# Patient Record
Sex: FEMALE | Race: WHITE | NOT HISPANIC OR LATINO | URBAN - METROPOLITAN AREA
[De-identification: names, ages, dates, MRNs, and addresses within clinical notes are randomized per-mention and may not be internally consistent; named-entity substitution may affect disease eponyms.]

---

## 2023-05-28 ENCOUNTER — EMERGENCY (EMERGENCY)
Facility: HOSPITAL | Age: 43
LOS: 0 days | Discharge: HOME | End: 2023-05-28
Attending: STUDENT IN AN ORGANIZED HEALTH CARE EDUCATION/TRAINING PROGRAM
Payer: COMMERCIAL

## 2023-05-28 VITALS — HEIGHT: 64 IN | WEIGHT: 143.08 LBS

## 2023-05-28 VITALS
HEART RATE: 80 BPM | OXYGEN SATURATION: 100 % | DIASTOLIC BLOOD PRESSURE: 75 MMHG | TEMPERATURE: 99 F | SYSTOLIC BLOOD PRESSURE: 123 MMHG | RESPIRATION RATE: 16 BRPM

## 2023-05-28 DIAGNOSIS — R06.02 SHORTNESS OF BREATH: ICD-10-CM

## 2023-05-28 DIAGNOSIS — R52 PAIN, UNSPECIFIED: ICD-10-CM

## 2023-05-28 DIAGNOSIS — Z90.89 ACQUIRED ABSENCE OF OTHER ORGANS: ICD-10-CM

## 2023-05-28 DIAGNOSIS — R00.2 PALPITATIONS: ICD-10-CM

## 2023-05-28 DIAGNOSIS — R53.83 OTHER FATIGUE: ICD-10-CM

## 2023-05-28 DIAGNOSIS — Z85.850 PERSONAL HISTORY OF MALIGNANT NEOPLASM OF THYROID: ICD-10-CM

## 2023-05-28 LAB
ALBUMIN SERPL ELPH-MCNC: 4.1 G/DL — SIGNIFICANT CHANGE UP (ref 3.3–5)
ALP SERPL-CCNC: 63 U/L — SIGNIFICANT CHANGE UP (ref 40–120)
ALT FLD-CCNC: 25 U/L — SIGNIFICANT CHANGE UP (ref 12–78)
ANION GAP SERPL CALC-SCNC: 5 MMOL/L — SIGNIFICANT CHANGE UP (ref 5–17)
AST SERPL-CCNC: 22 U/L — SIGNIFICANT CHANGE UP (ref 15–37)
BASOPHILS # BLD AUTO: 0.04 K/UL — SIGNIFICANT CHANGE UP (ref 0–0.2)
BASOPHILS NFR BLD AUTO: 0.7 % — SIGNIFICANT CHANGE UP (ref 0–2)
BILIRUB SERPL-MCNC: 0.4 MG/DL — SIGNIFICANT CHANGE UP (ref 0.2–1.2)
BUN SERPL-MCNC: 8 MG/DL — SIGNIFICANT CHANGE UP (ref 7–23)
CALCIUM SERPL-MCNC: 9.2 MG/DL — SIGNIFICANT CHANGE UP (ref 8.5–10.1)
CHLORIDE SERPL-SCNC: 107 MMOL/L — SIGNIFICANT CHANGE UP (ref 96–108)
CO2 SERPL-SCNC: 29 MMOL/L — SIGNIFICANT CHANGE UP (ref 22–31)
CREAT SERPL-MCNC: 0.7 MG/DL — SIGNIFICANT CHANGE UP (ref 0.5–1.3)
D DIMER BLD IA.RAPID-MCNC: 204 NG/ML DDU — SIGNIFICANT CHANGE UP
EGFR: 110 ML/MIN/1.73M2 — SIGNIFICANT CHANGE UP
EOSINOPHIL # BLD AUTO: 0.06 K/UL — SIGNIFICANT CHANGE UP (ref 0–0.5)
EOSINOPHIL NFR BLD AUTO: 1.1 % — SIGNIFICANT CHANGE UP (ref 0–6)
GLUCOSE SERPL-MCNC: 88 MG/DL — SIGNIFICANT CHANGE UP (ref 70–99)
HCT VFR BLD CALC: 38.8 % — SIGNIFICANT CHANGE UP (ref 34.5–45)
HGB BLD-MCNC: 12 G/DL — SIGNIFICANT CHANGE UP (ref 11.5–15.5)
IMM GRANULOCYTES NFR BLD AUTO: 0.2 % — SIGNIFICANT CHANGE UP (ref 0–0.9)
LYMPHOCYTES # BLD AUTO: 1.69 K/UL — SIGNIFICANT CHANGE UP (ref 1–3.3)
LYMPHOCYTES # BLD AUTO: 29.9 % — SIGNIFICANT CHANGE UP (ref 13–44)
MAGNESIUM SERPL-MCNC: 2 MG/DL — SIGNIFICANT CHANGE UP (ref 1.6–2.6)
MCHC RBC-ENTMCNC: 25 PG — LOW (ref 27–34)
MCHC RBC-ENTMCNC: 30.9 GM/DL — LOW (ref 32–36)
MCV RBC AUTO: 80.8 FL — SIGNIFICANT CHANGE UP (ref 80–100)
MONOCYTES # BLD AUTO: 0.27 K/UL — SIGNIFICANT CHANGE UP (ref 0–0.9)
MONOCYTES NFR BLD AUTO: 4.8 % — SIGNIFICANT CHANGE UP (ref 2–14)
NEUTROPHILS # BLD AUTO: 3.59 K/UL — SIGNIFICANT CHANGE UP (ref 1.8–7.4)
NEUTROPHILS NFR BLD AUTO: 63.3 % — SIGNIFICANT CHANGE UP (ref 43–77)
NT-PROBNP SERPL-SCNC: 41 PG/ML — SIGNIFICANT CHANGE UP (ref 0–125)
PLATELET # BLD AUTO: 269 K/UL — SIGNIFICANT CHANGE UP (ref 150–400)
POTASSIUM SERPL-MCNC: 3.9 MMOL/L — SIGNIFICANT CHANGE UP (ref 3.5–5.3)
POTASSIUM SERPL-SCNC: 3.9 MMOL/L — SIGNIFICANT CHANGE UP (ref 3.5–5.3)
PROT SERPL-MCNC: 8 GM/DL — SIGNIFICANT CHANGE UP (ref 6–8.3)
RBC # BLD: 4.8 M/UL — SIGNIFICANT CHANGE UP (ref 3.8–5.2)
RBC # FLD: 15.7 % — HIGH (ref 10.3–14.5)
SODIUM SERPL-SCNC: 141 MMOL/L — SIGNIFICANT CHANGE UP (ref 135–145)
TROPONIN I, HIGH SENSITIVITY RESULT: <3 NG/L — SIGNIFICANT CHANGE UP
TSH SERPL-MCNC: 2.9 UU/ML — SIGNIFICANT CHANGE UP (ref 0.34–4.82)
WBC # BLD: 5.66 K/UL — SIGNIFICANT CHANGE UP (ref 3.8–10.5)
WBC # FLD AUTO: 5.66 K/UL — SIGNIFICANT CHANGE UP (ref 3.8–10.5)

## 2023-05-28 PROCEDURE — 85025 COMPLETE CBC W/AUTO DIFF WBC: CPT

## 2023-05-28 PROCEDURE — 80053 COMPREHEN METABOLIC PANEL: CPT

## 2023-05-28 PROCEDURE — 93010 ELECTROCARDIOGRAM REPORT: CPT

## 2023-05-28 PROCEDURE — 93005 ELECTROCARDIOGRAM TRACING: CPT

## 2023-05-28 PROCEDURE — 36415 COLL VENOUS BLD VENIPUNCTURE: CPT

## 2023-05-28 PROCEDURE — 83880 ASSAY OF NATRIURETIC PEPTIDE: CPT

## 2023-05-28 PROCEDURE — 71045 X-RAY EXAM CHEST 1 VIEW: CPT

## 2023-05-28 PROCEDURE — 99285 EMERGENCY DEPT VISIT HI MDM: CPT | Mod: 25

## 2023-05-28 PROCEDURE — 84443 ASSAY THYROID STIM HORMONE: CPT

## 2023-05-28 PROCEDURE — 83735 ASSAY OF MAGNESIUM: CPT

## 2023-05-28 PROCEDURE — 99285 EMERGENCY DEPT VISIT HI MDM: CPT

## 2023-05-28 PROCEDURE — 85379 FIBRIN DEGRADATION QUANT: CPT

## 2023-05-28 PROCEDURE — 71045 X-RAY EXAM CHEST 1 VIEW: CPT | Mod: 26

## 2023-05-28 PROCEDURE — 84484 ASSAY OF TROPONIN QUANT: CPT

## 2023-05-28 RX ORDER — SODIUM CHLORIDE 9 MG/ML
1000 INJECTION INTRAMUSCULAR; INTRAVENOUS; SUBCUTANEOUS ONCE
Refills: 0 | Status: COMPLETED | OUTPATIENT
Start: 2023-05-28 | End: 2023-05-28

## 2023-05-28 RX ADMIN — SODIUM CHLORIDE 1000 MILLILITER(S): 9 INJECTION INTRAMUSCULAR; INTRAVENOUS; SUBCUTANEOUS at 13:20

## 2023-05-28 NOTE — ED STATDOCS - PROGRESS NOTE DETAILS
PA: Patient is a 43 y/o F with PMHx of thyroid CA s/p thyroid lobectomy who presents to [facility] c/o SOB, palpitations, fatigue general malaise for several days. Had blood work done which was normal, intermittent in nature. no coughing of blood, hemoptysis, fevers, CP, sx worse with exertion. ~Bruce Wilson PA-C PA note: All labwork/radiology results discussed in detail with patient. Patient re-examined and re-evaluated. Patient feels much better at this time. ED evaluation, Diagnosis and management discussed with the patient in detail. Workup results discussed with ED attending, OK to dc home. Close PMD & CARDIO follow up encouraged, aftercare to assist with scheduling appointment ASAP. Strict ED return instructions discussed in detail and patient given the opportunity to ask any questions about their discharge diagnosis and instructions. Patient verbalized understanding. ~ Bruce Wilson PA-C

## 2023-05-28 NOTE — ED STATDOCS - NS ED ATTENDING STATEMENT MOD
This was a shared visit with the MEDHAT. I reviewed and verified the documentation and independently performed the documented:

## 2023-05-28 NOTE — ED STATDOCS - PHYSICAL EXAMINATION
PA NOTE: GEN: AOX3, NAD. HEENT: Throat clear. Airway is patent. EYES: PERRLA. EOMI. Head: NC/AT. NECK: Supple, No JVD. FROM. C-spine non-tender. CV:S1S2, RRR, LUNGS: Non-labored breathing, no tachypnea. O2sat 100% RA. CTA b/l. No w/r/r. CHEST: Equal chest expansion and rise. No deformity. ABD: Soft, NT/ND, no rebound, no guarding. No CVAT. EXT: No e/c/c. 2+ distal pulses. SKIN: No rashes. NEURO: No focal deficits. CN II-XII intact. FROM. 5/5 motor and sensory. ~Bruce Wilson PA-C

## 2023-05-28 NOTE — ED STATDOCS - OBJECTIVE STATEMENT
41 y/o F with a PMHx of thyroid CA s/p thyroid lobectomy presents to the ED c/o SOB, palpitations, fatigue general malaise for several days. Had blood work done which was normal, intermittent in nature. no coughing of blood, hemoptysis, fevers, CP, sx worse with exertion. No recent travel, no hormone therapy. also having burning like pain b/l.

## 2023-05-28 NOTE — ED STATDOCS - ATTENDING APP SHARED VISIT CONTRIBUTION OF CARE
I, Lyle Maravilla, DO personally saw the patient with MEDHAT.  I have personally performed a face to face diagnostic evaluation on this patient.  I have reviewed the MEDHAT note and agree with the history, exam, and plan of care, except as noted.  I personally saw the patient and performed a substantive portion of the visit including all aspects of the medical decision making.

## 2023-05-28 NOTE — ED STATDOCS - PATIENT PORTAL LINK FT
You can access the FollowMyHealth Patient Portal offered by [facility] by registering at the following website: [email or web address]. By joining [employer]’s FollowMyHealth portal, you will also be able to view your health information using other applications (apps) compatible with our system.

## 2023-05-28 NOTE — ED STATDOCS - NSFOLLOWUPINSTRUCTIONS_ED_ALL_ED_FT
Palpitations  Palpitations are feelings that your heartbeat is irregular or is faster than normal. It may feel like your heart is fluttering or skipping a beat. Palpitations may be caused by many things, including smoking, caffeine, alcohol, stress, and certain medicines or drugs. Most causes of palpitations are not serious.    However, some palpitations can be a sign of a serious problem. Further tests and a thorough medical history will be done to find the cause of your palpitations. Your provider may order tests such as an ECG, labs, an echocardiogram, or an ambulatory continuous ECG monitor.    Follow these instructions at home:  Pay attention to any changes in your symptoms. Let your health care provider know about them. Take these actions to help manage your symptoms:    Eating and drinking    Follow instructions from your health care provider about eating or drinking restrictions. You may need to avoid foods and drinks that may cause palpitations. These may include:  Caffeinated coffee, tea, soft drinks, and energy drinks.  Chocolate.  Alcohol.  Diet pills.  Lifestyle    A person sitting on the floor doing yoga.  A sign telling the reader not to smoke.  Take steps to reduce your stress and anxiety. Things that can help you relax include:  Yoga.  Mind-body activities, such as deep breathing, meditation, or using words and images to create positive thoughts (guided imagery).  Physical activity, such as swimming, jogging, or walking. Tell your health care provider if your palpitations increase with activity. If you have chest pain or shortness of breath with activity, do not continue the activity until you are seen by your health care provider.  Biofeedback. This is a method that helps you learn to use your mind to control things in your body, such as your heartbeat.  Get plenty of rest and sleep. Keep a regular bed time.  Do not use drugs, including cocaine or ecstasy. Do not use marijuana.  Do not use any products that contain nicotine or tobacco. These products include cigarettes, chewing tobacco, and vaping devices, such as e-cigarettes. If you need help quitting, ask your health care provider.  General instructions    Take over-the-counter and prescription medicines only as told by your health care provider.  Keep all follow-up visits. This is important. These may include visits for further testing if palpitations do not go away or get worse.  Contact a health care provider if:  You continue to have a fast or irregular heartbeat for a long period of time.  You notice that your palpitations occur more often.  Get help right away if:  You have chest pain or shortness of breath.  You have a severe headache.  You feel dizzy or you faint.  These symptoms may represent a serious problem that is an emergency. Do not wait to see if the symptoms will go away. Get medical help right away. Call your local emergency services (911 in the [place] Do not drive yourself to the hospital.    Summary  Palpitations are feelings that your heartbeat is irregular or is faster than normal. It may feel like your heart is fluttering or skipping a beat.  Palpitations may be caused by many things, including smoking, caffeine, alcohol, stress, certain medicines, and drugs.  Further tests and a thorough medical history may be done to find the cause of your palpitations.  Get help right away if you faint or have chest pain, shortness of breath, severe headache, or dizziness.  This information is not intended to replace advice given to you by your health care provider. Make sure you discuss any questions you have with your health care provider.

## 2023-05-28 NOTE — ED ADULT NURSE NOTE - NSFALLUNIVINTERV_ED_ALL_ED
Bed/Stretcher in lowest position, wheels locked, appropriate side rails in place/Call bell, personal items and telephone in reach/Instruct patient to call for assistance before getting out of bed/chair/stretcher/Non-slip footwear applied when patient is off stretcher/[place] to call system/Physically safe environment - no spills, clutter or unnecessary equipment/Purposeful proactive rounding/Room/bathroom lighting operational, light cord in reach

## 2023-05-28 NOTE — ED STATDOCS - PROVIDER TOKENS
Thank you for choosing [facility] Podiatry / Foot & Ankle Surgery!    DR. HENDRIX'S CLINIC LOCATIONS     [facility] SURGERY: [phone]   [address] [place] [address] APPOINTMENTS: [phone]   [place] [address] BILLING QUESTIONS: [phone]  [phone] RADIOLOGY: [phone]       [address]  [address]    [place], [place] [address] [phone]  [phone]      Follow up: 2 months    Gradually return to weightbearing activities.  Orthotics should be renewed annually.  Physical therapy is an option if you have difficulty returning to regular activity.        Flu vaccines are now available at all [facility] clinics and retail pharmacies across the [facility]. Appointments are required for clinic locations. To schedule an appointment online, please log into [employer] or create an account if you are a new user. You can also call [phone], or simply walk in at one of the [facility] retail pharmacy locations.      PROVIDER:[TOKEN:[[id number]],FOLLOWUP:[Urgent]]

## 2023-05-28 NOTE — ED STATDOCS - CLINICAL SUMMARY MEDICAL DECISION MAKING FREE TEXT BOX
41 y/o F with SOB, palpitations. Plan: thyroid check, ddimer, trop, CXR. If all negative will d/c with PCP f/u. hx of palpitations in the past with halter negative which was negative. 43 y/o F with SOB, palpitations. VSS, exam unremarkable. Plan: thyroid check, ddimer, trop, CXR. If all negative will d/c with PCP f/u. hx of palpitations in the past with halter negative which was negative. 43 y/o F with SOB, palpitations. VSS, exam unremarkable. Plan: thyroid check, ddimer, trop, CXR. If all negative will d/c with PCP f/u. hx of palpitations in the past with halter negative which was negative.      PA note: All labwork/radiology results discussed in detail with patient. Patient re-examined and re-evaluated. Patient feels much better at this time. ED evaluation, Diagnosis and management discussed with the patient in detail. Workup results discussed with ED attending, OK to CA home. Close PMD & CARDIO follow up encouraged, aftercare to assist with scheduling appointment ASAP. Strict ED return instructions discussed in detail and patient given the opportunity to ask any questions about their discharge diagnosis and instructions. Patient verbalized understanding. ~ Bruce Wilson PA-C

## 2023-05-28 NOTE — ED ADULT TRIAGE NOTE - CHIEF COMPLAINT QUOTE
Pt presents to ER c/o SOB, CP and palpitations. Onset of symptoms began 5 days PTA and have been intermittent. Pt reports HENRY

## 2023-05-28 NOTE — ED STATDOCS - CARE PROVIDER_API CALL
No significant past surgical history
Guicho Walsh  Cardiovascular Disease  [address], [address]  [place] [address]  Phone: [phone]  Fax: [phone]  Follow Up Time: Urgent

## 2023-05-28 NOTE — ED ADULT NURSE NOTE - OBJECTIVE STATEMENT
Pt to Ed c/o palpitations x3 days, ptv reports back pain as well, pt reports HENRY x 3days, reports nausea and diarrhea x  days, PIV obtained, labs sent, EKG done or cardiac monitor.

## 2023-05-31 ENCOUNTER — INPATIENT (INPATIENT)
Facility: HOSPITAL | Age: 43
LOS: 0 days | Discharge: HOME | DRG: 103 | End: 2023-06-01
Attending: STUDENT IN AN ORGANIZED HEALTH CARE EDUCATION/TRAINING PROGRAM | Admitting: INTERNAL MEDICINE
Payer: COMMERCIAL

## 2023-05-31 VITALS
DIASTOLIC BLOOD PRESSURE: 88 MMHG | TEMPERATURE: 98 F | OXYGEN SATURATION: 100 % | RESPIRATION RATE: 17 BRPM | HEIGHT: 64 IN | SYSTOLIC BLOOD PRESSURE: 127 MMHG | HEART RATE: 89 BPM | WEIGHT: 149.91 LBS

## 2023-05-31 LAB
ALBUMIN SERPL ELPH-MCNC: 3.8 G/DL — SIGNIFICANT CHANGE UP (ref 3.3–5)
ALP SERPL-CCNC: 57 U/L — SIGNIFICANT CHANGE UP (ref 40–120)
ALT FLD-CCNC: 22 U/L — SIGNIFICANT CHANGE UP (ref 12–78)
ANION GAP SERPL CALC-SCNC: 5 MMOL/L — SIGNIFICANT CHANGE UP (ref 5–17)
APTT BLD: 27.3 SEC — LOW (ref 27.5–35.5)
AST SERPL-CCNC: 19 U/L — SIGNIFICANT CHANGE UP (ref 15–37)
BASOPHILS # BLD AUTO: 0.02 K/UL — SIGNIFICANT CHANGE UP (ref 0–0.2)
BASOPHILS NFR BLD AUTO: 0.4 % — SIGNIFICANT CHANGE UP (ref 0–2)
BILIRUB SERPL-MCNC: 0.5 MG/DL — SIGNIFICANT CHANGE UP (ref 0.2–1.2)
BUN SERPL-MCNC: 9 MG/DL — SIGNIFICANT CHANGE UP (ref 7–23)
CALCIUM SERPL-MCNC: 9 MG/DL — SIGNIFICANT CHANGE UP (ref 8.5–10.1)
CHLORIDE SERPL-SCNC: 108 MMOL/L — SIGNIFICANT CHANGE UP (ref 96–108)
CK SERPL-CCNC: 68 U/L — SIGNIFICANT CHANGE UP (ref 26–192)
CO2 SERPL-SCNC: 28 MMOL/L — SIGNIFICANT CHANGE UP (ref 22–31)
CREAT SERPL-MCNC: 0.82 MG/DL — SIGNIFICANT CHANGE UP (ref 0.5–1.3)
EGFR: 91 ML/MIN/1.73M2 — SIGNIFICANT CHANGE UP
EOSINOPHIL # BLD AUTO: 0.08 K/UL — SIGNIFICANT CHANGE UP (ref 0–0.5)
EOSINOPHIL NFR BLD AUTO: 1.6 % — SIGNIFICANT CHANGE UP (ref 0–6)
GLUCOSE SERPL-MCNC: 106 MG/DL — HIGH (ref 70–99)
HCG SERPL-ACNC: <1 MIU/ML — SIGNIFICANT CHANGE UP
HCT VFR BLD CALC: 38.3 % — SIGNIFICANT CHANGE UP (ref 34.5–45)
HGB BLD-MCNC: 12 G/DL — SIGNIFICANT CHANGE UP (ref 11.5–15.5)
IMM GRANULOCYTES NFR BLD AUTO: 0.2 % — SIGNIFICANT CHANGE UP (ref 0–0.9)
INR BLD: 1.06 RATIO — SIGNIFICANT CHANGE UP (ref 0.88–1.16)
LYMPHOCYTES # BLD AUTO: 1.79 K/UL — SIGNIFICANT CHANGE UP (ref 1–3.3)
LYMPHOCYTES # BLD AUTO: 35.2 % — SIGNIFICANT CHANGE UP (ref 13–44)
MCHC RBC-ENTMCNC: 25.1 PG — LOW (ref 27–34)
MCHC RBC-ENTMCNC: 31.3 GM/DL — LOW (ref 32–36)
MCV RBC AUTO: 80 FL — SIGNIFICANT CHANGE UP (ref 80–100)
MONOCYTES # BLD AUTO: 0.38 K/UL — SIGNIFICANT CHANGE UP (ref 0–0.9)
MONOCYTES NFR BLD AUTO: 7.5 % — SIGNIFICANT CHANGE UP (ref 2–14)
NEUTROPHILS # BLD AUTO: 2.81 K/UL — SIGNIFICANT CHANGE UP (ref 1.8–7.4)
NEUTROPHILS NFR BLD AUTO: 55.1 % — SIGNIFICANT CHANGE UP (ref 43–77)
PLATELET # BLD AUTO: 242 K/UL — SIGNIFICANT CHANGE UP (ref 150–400)
POTASSIUM SERPL-MCNC: 3.2 MMOL/L — LOW (ref 3.5–5.3)
POTASSIUM SERPL-SCNC: 3.2 MMOL/L — LOW (ref 3.5–5.3)
PROT SERPL-MCNC: 7.5 GM/DL — SIGNIFICANT CHANGE UP (ref 6–8.3)
PROTHROM AB SERPL-ACNC: 12.3 SEC — SIGNIFICANT CHANGE UP (ref 10.5–13.4)
RBC # BLD: 4.79 M/UL — SIGNIFICANT CHANGE UP (ref 3.8–5.2)
RBC # FLD: 15.7 % — HIGH (ref 10.3–14.5)
SODIUM SERPL-SCNC: 141 MMOL/L — SIGNIFICANT CHANGE UP (ref 135–145)
TROPONIN I, HIGH SENSITIVITY RESULT: <3 NG/L — SIGNIFICANT CHANGE UP
TROPONIN I, HIGH SENSITIVITY RESULT: <3 NG/L — SIGNIFICANT CHANGE UP
TSH SERPL-MCNC: 3.31 UU/ML — SIGNIFICANT CHANGE UP (ref 0.34–4.82)
WBC # BLD: 5.09 K/UL — SIGNIFICANT CHANGE UP (ref 3.8–10.5)
WBC # FLD AUTO: 5.09 K/UL — SIGNIFICANT CHANGE UP (ref 3.8–10.5)

## 2023-05-31 PROCEDURE — 71045 X-RAY EXAM CHEST 1 VIEW: CPT | Mod: 26

## 2023-05-31 PROCEDURE — 70496 CT ANGIOGRAPHY HEAD: CPT | Mod: 26,MA

## 2023-05-31 PROCEDURE — 99285 EMERGENCY DEPT VISIT HI MDM: CPT

## 2023-05-31 PROCEDURE — 0042T: CPT

## 2023-05-31 PROCEDURE — 93010 ELECTROCARDIOGRAM REPORT: CPT

## 2023-05-31 PROCEDURE — 70498 CT ANGIOGRAPHY NECK: CPT | Mod: 26,MA

## 2023-05-31 RX ORDER — POTASSIUM CHLORIDE 20 MEQ
40 PACKET (EA) ORAL ONCE
Refills: 0 | Status: COMPLETED | OUTPATIENT
Start: 2023-05-31 | End: 2023-05-31

## 2023-05-31 RX ORDER — ASPIRIN/CALCIUM CARB/MAGNESIUM 324 MG
324 TABLET ORAL ONCE
Refills: 0 | Status: COMPLETED | OUTPATIENT
Start: 2023-05-31 | End: 2023-05-31

## 2023-05-31 RX ADMIN — Medication 324 MILLIGRAM(S): at 19:35

## 2023-05-31 RX ADMIN — Medication 40 MILLIEQUIVALENT(S): at 20:39

## 2023-05-31 NOTE — ED ADULT TRIAGE NOTE - CHIEF COMPLAINT QUOTE
Pt states she has been having SOB on exertion and palpitations since Wednesday. Today Pt had blurry vision and flashing lights in vision lasting approximately 30 minutes. Symptoms having resolved within the last 20 minutes. Dr. Choi notified to rule out code stroke and code stroke activated by MD at 1746. History of thyroid cancer and breast cancer.

## 2023-05-31 NOTE — ED ADULT TRIAGE NOTE - ARRIVAL FROM
Recommendations (Free Text): To follow up with their primary care physician yearly or as indicated.\\nNo suspicious lesions noted on skin examination. Detail Level: Zone Recommendation Preamble: The following recommendations were made during the visit: Render Risk Assessment In Note?: no Home

## 2023-05-31 NOTE — ED ADULT NURSE REASSESSMENT NOTE - NS ED NURSE REASSESS COMMENT FT1
Pt report received from Sheila BERMAN. Pt contact made. Pt has no new complaints at this time. Pt is Aox4 and speaking in full sentences. Pt plan pending at this time. Pt safety and comfort maintained.

## 2023-05-31 NOTE — ED PROVIDER NOTE - CONSTITUTIONAL, MLM
normal... Well appearing, awake, alert, oriented to person, place, time/situation and in no apparent distress. Well appearing WF, awake, alert, mildly anxious, and in no apparent distress.

## 2023-05-31 NOTE — ED PROVIDER NOTE - ENMT, MLM
Airway patent, Nasal mucosa clear. Mouth with normal mucosa. Throat has no vesicles, no oropharyngeal exudates and uvula is midline. Airway patent, Nasal mucosa clear. Mouth with mildly dry mucous membranes. Throat has no vesicles, no oropharyngeal exudates and uvula is midline.

## 2023-05-31 NOTE — ED PROVIDER NOTE - CLINICAL SUMMARY MEDICAL DECISION MAKING FREE TEXT BOX
BEFAST at ems station = 0 42 year old female 1 week intermittent palpitations with occasional associated chest discomfort/SOB. Seen 2x at local outside EDs with workup reportedly negative, FELICIANO Barros cardio appointment tomorrow presents BIBA from home c/o recurrent palpitations with some chest discomfort, at 4:40 followed by blurry vision left visual field followed by flashing lights x 20-30 minutes, self resolved without recurrence. No associated headache. Chest pain minimal upon ED arrival. BEFAST at ems station = 0. Plan: Code stroke initiated upon ED arrival, Code stroke studies including CT angio perfusion studies, stroke/cardiac labs, NIHSS, dysphagia screen. Monitor, observe and reassess. Discuss with neurology. Pt is not a thrombolytic candidate due to NIHSS score of 0, pt now free of neuro symptoms. 42 year old female 1 week intermittent palpitations with occasional associated chest discomfort/SOB. Seen 2x at local outside EDs with workup reportedly negative, FELICIANO Barros cardio appointment tomorrow presents BIBA from home c/o recurrent palpitations with some chest discomfort, at 4:40 followed by blurry vision left visual field followed by flashing lights x 20-30 minutes, self resolved without recurrence. No associated headache. Chest pain minimal upon ED arrival. BEFAST at ems station = 0.   Plan: Code stroke initiated upon ED arrival, Code stroke studies including CT angio perfusion studies, stroke/cardiac labs, NIHSS, dysphagia screen. Monitor, observe and reassess. Discuss with neurology. Pt is not a thrombolytic candidate due to NIHSS score of 0, pt now free of neuro symptoms.    See Progress Notes re: case progression, d/w neurology, disposition. 42 year old WF, 1 week intermittent palpitations with occasional associated chest discomfort/SOB, seen 2x at local outside EDs with workup reportedly negative, FELICIANO Barros Cardio appointment tomorrow, presents BIBA from home c/o recurrent palpitations with some chest discomfort, at 4:40 followed by blurry vision left visual field followed by flashing lights x 20-30 minutes, self-resolved without recurrence. No associated headache. Chest pain minimal upon ED arrival. BEFAST at EMS station = 0.   Plan: Code stroke initiated upon ED arrival, Code stroke studies including CT angio/perfusion studies, stroke/cardiac labs, NIHSS, dysphagia screen. Monitor, observe and reassess. Discuss with Neurology. Pt is not a thrombolytic candidate due to NIHSS score of 0, pt now free of neuro symptoms.    See Progress Notes re: case progression, d/w neurology, disposition.

## 2023-05-31 NOTE — ED PROVIDER NOTE - PROGRESS NOTE DETAILS
Edith MESSINA for Dr. Choi  CT code stroke non con, no acute pathology Edith MESSINA for Dr. Choi   Case discussed with Dr. Richter including BEFAST = 0, CT head non con negative. Neuro exam/NIHSS in progress but appears normal. if pt aggrees tele admission to rule out TIA with MRI as inpatient tomorrow, with neuro consult in AM. Edith MESSINA for Dr. Choi   Case discussed with Dr. Richter including BEFAST = 0, CT head non con negative. Neuro exam/NIHSS in progress but appears normal. if pt agrees tele admission to rule out TIA with MRI as inpatient tomorrow, with neuro consult in AM. Edith Choi   CT angio perfusion [facility] [place] [facility]  Labs normal including preg test, trop, TSH, CTA studies negative. Potassium slightly low at 3.2, orally supplemented. Pt currently asymptomatic. Normal neuro exam. Tele admission offered for further monitoring, formal neuro and cardio consult in the AM, consider MRI in the AM. Pt accepted. Hospitalist called for admission, message left. JANIYA Choi MD:  Dr. Gruber aware of Tele admission. Edith Choi   CT angio & perfusion studies normal Edith Choi  CT code stroke noncon: no acute pathology Edith MESSINA for Dr. Choi   Case discussed with Dr. Richter including BEFAST = 0, CT head non-con negative. Neuro exam/NIHSS in progress but appears normal. if pt agrees, Tele admission to rule out TIA with MRI as inpatient tomorrow, with Neuro/cardio consults in AM. [facility]  Labs normal including preg test, Troponin, TSH. CTA studies negative. Potassium slightly low at 3.2, orally supplemented. Pt currently asymptomatic. Normal neuro exam. Tele admission offered for further monitoring & formal Neuro/Cardio consults in the AM, consider MRI in the AM. Pt accepted. Hospitalist called for admission, phone message left.

## 2023-05-31 NOTE — ED PROVIDER NOTE - NEUROLOGICAL, MLM
Alert and oriented, no focal deficits, no motor or sensory deficits. Alert and oriented, no focal deficits, no motor or sensory deficits.  CNs II - XII intact, normal speech, no visual field disturbance.

## 2023-05-31 NOTE — ED ADULT NURSE REASSESSMENT NOTE - NS ED NURSE REASSESS COMMENT FT1
NA obtained ortrhostatics- pt reports upon standing she feels chest pressure, heart pounding and heavy/weak legs.

## 2023-05-31 NOTE — ED PROVIDER NOTE - EYES, MLM
Clear bilaterally, pupils equal, round and reactive to light. Clear bilaterally, pupils equal, round and reactive to light. No visual disturbances. Clear bilaterally, pupils equal, round and reactive to light. No visual disturbances.  EOMI

## 2023-05-31 NOTE — PHARMACOTHERAPY INTERVENTION NOTE - COMMENTS
Medication reconciliation completed.  Reviewed Medication list and confirmed med allergies with patient; confirmed with Dr. First Medkayli.

## 2023-05-31 NOTE — ED PROVIDER NOTE - MUSCULOSKELETAL, MLM
Spine appears normal, range of motion is not limited, no muscle or joint tenderness Spine appears normal, range of motion is not limited, no muscle or joint tenderness.  SAWYER x 4.

## 2023-05-31 NOTE — ED ADULT NURSE NOTE - NSFALLUNIVINTERV_ED_ALL_ED
Bed/Stretcher in lowest position, wheels locked, appropriate side rails in place/Call bell, personal items and telephone in reach/Instruct patient to call for assistance before getting out of bed/chair/stretcher/Non-slip footwear applied when patient is off stretcher/[place] to call system/Physically safe environment - no spills, clutter or unnecessary equipment/Purposeful proactive rounding/Room/bathroom lighting operational, light cord in reach

## 2023-05-31 NOTE — ED PROVIDER NOTE - OBJECTIVE STATEMENT
42 year old female with PMHx of thyroid CA s/p partial thyroidectomy not on medication currently presents to the ED c/o palpitations x 1 week. Describes the palpitations as rapid heart rate, and states that any exertion causes the palpitations to occur. Pt had outpt labwork on Sunday which showed normal TSH levels. Then pt states that at about 4:50pm today, she had sudden onset of increasingly blurry vision followed flashing lights in her vision for a total duration of about 30 minutes. Denies headache but notes some mild mid chest tightness that has eased off a bit. Pt has appointment cardio appointment Dr. Barros tomorrow. Denies LOC, trouble speaking, numbness or weakness of extremities. 42 year old female with PMHx of thyroid CA s/p partial thyroidectomy not on medication currently presents to the ED c/o palpitations x 1 week. Describes the palpitations as rapid heart rate, and states that any exertion causes the palpitations to occur. Pt had outpt labwork on Sunday which showed normal TSH levels. Then pt states that at about 4:40pm today, she had sudden onset of chest pain followed by increasingly blurry vision followed by flashing lights in her left field of vision for a total duration of about 30 minutes. Also mentions burning chest pain which has improved and is now more of an aching, non pleuritic chest pressure and is 3/10 in severity. Denies tunnel vision. Denies headache but notes some mild mid chest tightness that has eased off a bit. Pt has appointment cardio appointment Dr. Barros tomorrow. Denies LOC, trouble speaking, numbness or weakness of extremities. 42 year old female with PMHx of thyroid CA s/p partial thyroidectomy not on medication currently presents to the ED c/o palpitations x 1 week, today + visual disturbance. Describes the palpitations as rapid heart rate, and states that any exertion causes the palpitations to occur. Pt has had outpt labwork on Sunday which was  normal incl. TSH levels. Then pt states that at about 4:40pm today, she had sudden onset of chest pain followed by increasingly blurry L field of vision followed by flashing lights in her left field of vision for a total duration of about 30 minutes, + self-resolved w/o recurrence. Also mentions burning chest pain which has improved and is now more of an aching, non-pleuritic chest pressure and is 3/10 in severity. Denies tunnel vision. Denies headache but notes some mild mid-chest tightness that has eased off a bit. Pt has appointment Cardio appointment Dr. Barros tomorrow. Denies LOC, trouble speaking, numbness or weakness of extremities.

## 2023-05-31 NOTE — ED ADULT NURSE NOTE - OBJECTIVE STATEMENT
Pt arrives to ED c/o palpitations x1 week upon standing and at rest, exacerbated upon standing. Pt reports her apple watch notifies her of the high HR and she feels like her heard is pumping stronger and harder. Around 430 PM, pt was lying down on her phone when she had spontaneous onset CP and noted a [place] of blurriness on phone screen, associated with flashing lights for ~ 30 minutes. Symptoms self-resolved. Significant pmhx thyroid ca (partial thyroidectomy not on medication) and breast ca. Code stroke activated @ 1746. At this time, pt endorsing chest tightness. Has cardiologist appointment tomorrow with MD Barros. Will obtain orthostatics.   NIH 0. PERRLA.

## 2023-06-01 ENCOUNTER — TRANSCRIPTION ENCOUNTER (OUTPATIENT)
Age: 43
End: 2023-06-01

## 2023-06-01 VITALS
TEMPERATURE: 98 F | HEART RATE: 71 BPM | RESPIRATION RATE: 18 BRPM | DIASTOLIC BLOOD PRESSURE: 60 MMHG | OXYGEN SATURATION: 100 % | SYSTOLIC BLOOD PRESSURE: 112 MMHG

## 2023-06-01 DIAGNOSIS — E89.0 POSTPROCEDURAL HYPOTHYROIDISM: Chronic | ICD-10-CM

## 2023-06-01 DIAGNOSIS — G45.9 TRANSIENT CEREBRAL ISCHEMIC ATTACK, UNSPECIFIED: ICD-10-CM

## 2023-06-01 DIAGNOSIS — Z98.890 OTHER SPECIFIED POSTPROCEDURAL STATES: Chronic | ICD-10-CM

## 2023-06-01 LAB
A1C WITH ESTIMATED AVERAGE GLUCOSE RESULT: 5.5 % — SIGNIFICANT CHANGE UP (ref 4–5.6)
ADD ON TEST-SPECIMEN IN LAB: SIGNIFICANT CHANGE UP
ANION GAP SERPL CALC-SCNC: 6 MMOL/L — SIGNIFICANT CHANGE UP (ref 5–17)
BUN SERPL-MCNC: 8 MG/DL — SIGNIFICANT CHANGE UP (ref 7–23)
CALCIUM SERPL-MCNC: 8.9 MG/DL — SIGNIFICANT CHANGE UP (ref 8.5–10.1)
CHLORIDE SERPL-SCNC: 112 MMOL/L — HIGH (ref 96–108)
CHOLEST SERPL-MCNC: 173 MG/DL — SIGNIFICANT CHANGE UP
CO2 SERPL-SCNC: 26 MMOL/L — SIGNIFICANT CHANGE UP (ref 22–31)
CREAT SERPL-MCNC: 0.64 MG/DL — SIGNIFICANT CHANGE UP (ref 0.5–1.3)
EGFR: 112 ML/MIN/1.73M2 — SIGNIFICANT CHANGE UP
ESTIMATED AVERAGE GLUCOSE: 111 MG/DL — SIGNIFICANT CHANGE UP (ref 68–114)
GLUCOSE BLDC GLUCOMTR-MCNC: 67 MG/DL — LOW (ref 70–99)
GLUCOSE SERPL-MCNC: 75 MG/DL — SIGNIFICANT CHANGE UP (ref 70–99)
HDLC SERPL-MCNC: 60 MG/DL — SIGNIFICANT CHANGE UP
IRON SATN MFR SERPL: 12 % — LOW (ref 14–50)
IRON SATN MFR SERPL: 47 UG/DL — SIGNIFICANT CHANGE UP (ref 30–160)
LIPID PNL WITH DIRECT LDL SERPL: 103 MG/DL — HIGH
NON HDL CHOLESTEROL: 113 MG/DL — SIGNIFICANT CHANGE UP
POTASSIUM SERPL-MCNC: 3.9 MMOL/L — SIGNIFICANT CHANGE UP (ref 3.5–5.3)
POTASSIUM SERPL-SCNC: 3.9 MMOL/L — SIGNIFICANT CHANGE UP (ref 3.5–5.3)
SARS-COV-2 RNA SPEC QL NAA+PROBE: SIGNIFICANT CHANGE UP
SODIUM SERPL-SCNC: 144 MMOL/L — SIGNIFICANT CHANGE UP (ref 135–145)
TIBC SERPL-MCNC: 381 UG/DL — SIGNIFICANT CHANGE UP (ref 220–430)
TRIGL SERPL-MCNC: 48 MG/DL — SIGNIFICANT CHANGE UP
UIBC SERPL-MCNC: 334 UG/DL — SIGNIFICANT CHANGE UP (ref 110–370)
VIT B12 SERPL-MCNC: 503 PG/ML — SIGNIFICANT CHANGE UP (ref 232–1245)

## 2023-06-01 PROCEDURE — 93306 TTE W/DOPPLER COMPLETE: CPT

## 2023-06-01 PROCEDURE — 99236 HOSP IP/OBS SAME DATE HI 85: CPT

## 2023-06-01 PROCEDURE — 70551 MRI BRAIN STEM W/O DYE: CPT

## 2023-06-01 PROCEDURE — 99223 1ST HOSP IP/OBS HIGH 75: CPT

## 2023-06-01 PROCEDURE — 80048 BASIC METABOLIC PNL TOTAL CA: CPT

## 2023-06-01 PROCEDURE — 97165 OT EVAL LOW COMPLEX 30 MIN: CPT | Mod: GO

## 2023-06-01 PROCEDURE — 82607 VITAMIN B-12: CPT

## 2023-06-01 PROCEDURE — 97162 PT EVAL MOD COMPLEX 30 MIN: CPT | Mod: GP

## 2023-06-01 PROCEDURE — 83550 IRON BINDING TEST: CPT

## 2023-06-01 PROCEDURE — 83540 ASSAY OF IRON: CPT

## 2023-06-01 PROCEDURE — 12345: CPT | Mod: NC,GC

## 2023-06-01 PROCEDURE — 93306 TTE W/DOPPLER COMPLETE: CPT | Mod: 26

## 2023-06-01 PROCEDURE — 70551 MRI BRAIN STEM W/O DYE: CPT | Mod: 26

## 2023-06-01 PROCEDURE — 97116 GAIT TRAINING THERAPY: CPT | Mod: GP

## 2023-06-01 PROCEDURE — 92610 EVALUATE SWALLOWING FUNCTION: CPT | Mod: GN

## 2023-06-01 PROCEDURE — 80061 LIPID PANEL: CPT

## 2023-06-01 PROCEDURE — 36415 COLL VENOUS BLD VENIPUNCTURE: CPT

## 2023-06-01 PROCEDURE — 92523 SPEECH SOUND LANG COMPREHEN: CPT | Mod: GN

## 2023-06-01 PROCEDURE — 82962 GLUCOSE BLOOD TEST: CPT

## 2023-06-01 PROCEDURE — 83036 HEMOGLOBIN GLYCOSYLATED A1C: CPT

## 2023-06-01 RX ORDER — SODIUM CHLORIDE 9 MG/ML
1000 INJECTION INTRAMUSCULAR; INTRAVENOUS; SUBCUTANEOUS
Refills: 0 | Status: DISCONTINUED | OUTPATIENT
Start: 2023-06-01 | End: 2023-06-01

## 2023-06-01 RX ORDER — ASPIRIN/CALCIUM CARB/MAGNESIUM 324 MG
81 TABLET ORAL DAILY
Refills: 0 | Status: DISCONTINUED | OUTPATIENT
Start: 2023-06-01 | End: 2023-06-01

## 2023-06-01 RX ORDER — METOPROLOL TARTRATE 50 MG
1 TABLET ORAL
Qty: 21 | Refills: 0
Start: 2023-06-01 | End: 2023-06-21

## 2023-06-01 RX ORDER — ATORVASTATIN CALCIUM 80 MG/1
80 TABLET, FILM COATED ORAL AT BEDTIME
Refills: 0 | Status: DISCONTINUED | OUTPATIENT
Start: 2023-06-01 | End: 2023-06-01

## 2023-06-01 RX ORDER — SODIUM CHLORIDE 9 MG/ML
500 INJECTION INTRAMUSCULAR; INTRAVENOUS; SUBCUTANEOUS ONCE
Refills: 0 | Status: COMPLETED | OUTPATIENT
Start: 2023-06-01 | End: 2023-06-01

## 2023-06-01 RX ORDER — METOPROLOL TARTRATE 50 MG
25 TABLET ORAL DAILY
Refills: 0 | Status: DISCONTINUED | OUTPATIENT
Start: 2023-06-01 | End: 2023-06-01

## 2023-06-01 RX ADMIN — Medication 25 MILLIGRAM(S): at 12:38

## 2023-06-01 RX ADMIN — SODIUM CHLORIDE 1000 MILLILITER(S): 9 INJECTION INTRAMUSCULAR; INTRAVENOUS; SUBCUTANEOUS at 14:43

## 2023-06-01 RX ADMIN — Medication 81 MILLIGRAM(S): at 09:32

## 2023-06-01 RX ADMIN — SODIUM CHLORIDE 100 MILLILITER(S): 9 INJECTION INTRAMUSCULAR; INTRAVENOUS; SUBCUTANEOUS at 05:33

## 2023-06-01 NOTE — OCCUPATIONAL THERAPY INITIAL EVALUATION ADULT - LIVES WITH, PROFILE
Pt lives with family with 3 steps to enter, 8 + 8 steps inside, IND prior, walk in showe with GB's, RN at [facility]

## 2023-06-01 NOTE — H&P ADULT - HISTORY OF PRESENT ILLNESS
42 year old Female with PMHx of thyroid CA s/p partial thyroidectomy not on medication, Breast cancer s/p Lumpectomy and radiation,  presented to the ED with complain of palpitations x 1 week. Describes the palpitations as rapid heart rate, and states that any exertion causes the palpitations to occur. Patient had outpatient  lab work on Sunday which showed normal TSH levels. About 4:40pm yesterday, she had sudden onset of chest pain followed by increasingly blurry vision followed by flashing lights in her left field of vision for a total duration of about 30 minutes. Also mentions burning chest pain which has improved and is now more of an aching, non pleuritic chest pressure and is 3/10 in severity. Denies tunnel vision. Denies headache but notes some mild mid chest tightness that has eased off a bit. Denies LOC, trouble speaking, numbness or weakness of extremities. All the symptoms are resolved since he has arrived in ED.

## 2023-06-01 NOTE — PATIENT PROFILE ADULT - FALL HARM RISK - CONCLUSION
LOV 10/27/18 for an annual exam.  BP: 132/86    Both last filled 5/20/19 for a 90d supply. No future appointments. Universal Safety Interventions

## 2023-06-01 NOTE — OCCUPATIONAL THERAPY INITIAL EVALUATION ADULT - GENERAL OBSERVATIONS, REHAB EVAL
Patient received semi-supine in bed, NAD, BP:107/64    HR:81- increases to 110 upon standing  o2: 99  . Patient left on stretcher for MRI     , VSS, NAD, all lines intact and all items within reach. .

## 2023-06-01 NOTE — OCCUPATIONAL THERAPY INITIAL EVALUATION ADULT - NSACTIVITYREC_GEN_A_OT
Pt presents close to functional baseline however with poor endurance and limited medically due to tachycardia upon standing. Trial OT program to see how pt progresses

## 2023-06-01 NOTE — SWALLOW BEDSIDE ASSESSMENT ADULT - COMMENTS
The pt was admitted to  with palpitations and a visual disturbance(blurriness, flashing lights) that are improving. CTH negative. This profile is superimposed upon a history of Thyroid Cancer s/p partial thyroidectomy, and past breast cancer s/p lumpectomy-RT.

## 2023-06-01 NOTE — PHYSICAL THERAPY INITIAL EVALUATION ADULT - PATIENT PROFILE REVIEW, REHAB EVAL
PMHx of thyroid CA s/p partial thyroidectomy not on medication, Breast cancer s/p Lumpectomy and radiation,/yes

## 2023-06-01 NOTE — SWALLOW BEDSIDE ASSESSMENT ADULT - SWALLOW EVAL: DIAGNOSIS
1) Pt is alert and interactive. Her receptive language abilities are felt to be preserved and she was able to verbalize during communicative probes without evidence of a primary motor speech or primary linguistic pathology.  Pt is communicatively competent and at reported communicative baseline.

## 2023-06-01 NOTE — DISCHARGE NOTE NURSING/CASE MANAGEMENT/SOCIAL WORK - PATIENT PORTAL LINK FT
You can access the FollowMyHealth Patient Portal offered by [facility] by registering at the following website: [email or web address]. By joining [employer]’s FollowMyHealth portal, you will also be able to view your health information using other applications (apps) compatible with our system.

## 2023-06-01 NOTE — SWALLOW BEDSIDE ASSESSMENT ADULT - NS SPL SWALLOW CLINIC TRIAL FT
Pt exhibited functional Oropharyngeal Swallowing integrity for age. Bolus formation/transfer were mechanically functional for age and laryngeal lift on palpation during swallowing trials was felt to be functional for age as well. NO behavioral aspiration signs exhibited. No change in O2 sats noted. Odynophagia was denied.

## 2023-06-01 NOTE — SWALLOW BEDSIDE ASSESSMENT ADULT - SWALLOW EVAL: CRITERIA FOR SKILLED INTERVENTION MET
DO NOT FEEL THAT ACUTE SPEECH PATHOLOGY FOLLOW UP WOULD CHANGE CLINICAL MANAGEMENT/OUTCOME IN HOSPITAL. PT'S SPEECH-LANGUAGE AND OROPHARYNGEAL SWALLOWING INTEGRITY ARE FELT TO BE FUNCTIONAL/AT BASELINE/MAXIMIZED. GIVEN ABOVE, THIS SERVICE WILL NOT ACTIVELY FOLLOW. RECONSULT PRN SHOULD STATUS CHANGE AND CONDITION WARRANT.

## 2023-06-01 NOTE — OCCUPATIONAL THERAPY INITIAL EVALUATION ADULT - ADL RETRAINING, OT EVAL
Patient will perform ADLs in standing while maintaining HR under 110 in 2 weeks. Patient will perform toilet transfer within feeling out of breath in 2 weeks.

## 2023-06-01 NOTE — DISCHARGE NOTE PROVIDER - HOSPITAL COURSE
42 year old Female with PMHx of thyroid CA s/p partial thyroidectomy not on medication, Breast cancer s/p Lumpectomy and radiation,  presented to the ED on 5/31/23 (17:44) with complain of palpitations x 1 week. About 16:40pm on 5/31/23, she had sudden onset of chest pain followed by increasingly blurry vision followed by flashing lights in her left field of vision for a total duration of about 30 minutes. All the symptoms resolved in ED. Code stroke initiated upon ED arrival, Code stroke studies including CT head and angio which were normal, stroke/cardiac labs unremarkable, NIHSS of 0, dysphagia screen unremarkable, and EKG with SR. Thrombolytic tx not provided as patient was not a candidate due to NIHSS score of 0 and free of neuro symptoms. In the ED VSS, and labs significant for hypokalemia (3.2) which improved to normal with potassium supplement. Pt was admitted to medicine service for further management. MRI showed no evidence for intracranial mass, acute territorial infarct, acute intracranial hemorrhage, or midline shift. Neurology was consulted, and recommended opthalmology evaluation as outpatient as pt likely had ocular migraine episode and unlikely TIA as exam non focal and unremarkable imaging studies. Cardiology was consulted. Due to no evidence of ACS, no acute intervention was provided. However, pt did present with postural orthostatic tachycardia syndrome, which patient reports of having prior to admission. IVF was provided and per cardiology metoprolol succinate 25mg was given which patient tolerated without complications. TTE was done which was unremarkable. Seen and evaluated by PT. No skilled PT indicated.      S: Pt was seen and evaluated by bedside on the day of discharge. Pt is medically optimized for discharge.     Vital Signs Last 24 Hrs  T(C): 36.4 (01 Jun 2023 07:46), Max: 36.6 (01 Jun 2023 04:20)  T(F): 97.6 (01 Jun 2023 07:46), Max: 97.9 (01 Jun 2023 04:20)  HR: 71 (01 Jun 2023 07:46) (71 - 89)  BP: 112/60 (01 Jun 2023 07:46) (99/59 - 127/88)  BP(mean): 82 (01 Jun 2023 04:20) (69 - 82)  RR: 18 (01 Jun 2023 07:46) (16 - 18)  SpO2: 100% (01 Jun 2023 07:46) (100% - 100%)  O2 Parameters below as of 01 Jun 2023 07:46  Patient On (Oxygen Delivery Method): room air    PE  General: Awake and alert, cooperative with exam. No acute distress.   Cardiology: Normal S1, S2. No murmurs. RRR.   Respiratory: Lungs CTABL. No wheezes, rales, or rhonchi.   Gastrointestinal: + BS. Soft. NT. ND. No guarding, rigidity, or rebound tenderness.  Extremities: No peripheral edema bilaterally. 2+ dorsalis pedis pulses.   Neurological: A+Ox3. CN 2-12 intact. No FND. Normal speech. No facial droop.   Psychiatric: Normal affect. Normal mood.    42 year old Female with PMHx of thyroid CA s/p partial thyroidectomy not on medication, Breast cancer s/p Lumpectomy and radiation,  presented to the ED on 5/31/23 (17:44) with complain of palpitations x 1 week. About 16:40pm on 5/31/23, she had sudden onset of chest pain followed by increasingly blurry vision followed by flashing lights in her left field of vision for a total duration of about 30 minutes. All the symptoms resolved in ED. Code stroke initiated upon ED arrival, Code stroke studies including CT head and angio which were normal, stroke/cardiac labs unremarkable, NIHSS of 0, dysphagia screen unremarkable, and EKG with SR. Thrombolytic tx not provided as patient was not a candidate due to NIHSS score of 0 and free of neuro symptoms. In the ED VSS, and labs significant for hypokalemia (3.2) which improved to normal with potassium supplement. Pt was admitted to medicine service for further management. MRI showed no evidence for intracranial mass, acute territorial infarct, acute intracranial hemorrhage, or midline shift. Neurology was consulted, and recommended opthalmology evaluation as outpatient as pt likely had ocular migraine episode and unlikely TIA as exam non focal and unremarkable imaging studies. Cardiology was consulted. Due to no evidence of ACS, no acute intervention was provided. However, pt did present with postural orthostatic tachycardia syndrome, which patient reports of having prior to admission. IVF was provided and per cardiology metoprolol succinate 25mg was given which patient tolerated without complications. TTE was done which was unremarkable. Seen and evaluated by PT. No skilled PT indicated.        S: Pt was seen and evaluated by bedside on the day of discharge. Pt is medically optimized for discharge.     Vital Signs Last 24 Hrs  T(C): 36.4 (01 Jun 2023 07:46), Max: 36.6 (01 Jun 2023 04:20)  T(F): 97.6 (01 Jun 2023 07:46), Max: 97.9 (01 Jun 2023 04:20)  HR: 71 (01 Jun 2023 07:46) (71 - 89)  BP: 112/60 (01 Jun 2023 07:46) (99/59 - 127/88)  BP(mean): 82 (01 Jun 2023 04:20) (69 - 82)  RR: 18 (01 Jun 2023 07:46) (16 - 18)  SpO2: 100% (01 Jun 2023 07:46) (100% - 100%)  O2 Parameters below as of 01 Jun 2023 07:46  Patient On (Oxygen Delivery Method): room air    PE  General: Awake and alert, cooperative with exam. No acute distress.   Cardiology: Normal S1, S2. No murmurs. RRR.   Respiratory: Lungs CTABL. No wheezes, rales, or rhonchi.   Gastrointestinal: + BS. Soft. NT. ND. No guarding, rigidity, or rebound tenderness.  Extremities: No peripheral edema bilaterally. 2+ dorsalis pedis pulses.   Neurological: A+Ox3. CN 2-12 intact. No FND. Normal speech. No facial droop.   Psychiatric: Normal affect. Normal mood.    42 year old Female with PMHx of thyroid CA s/p partial thyroidectomy not on medication, Breast cancer s/p Lumpectomy and radiation,  presented to the ED on 5/31/23 (17:44) with complain of palpitations x 1 week. About 16:40pm on 5/31/23, she had sudden onset of chest pain followed by increasingly blurry vision followed by flashing lights in her left field of vision for a total duration of about 30 minutes. All the symptoms resolved in ED. Code stroke initiated upon ED arrival, Code stroke studies including CT head and angio which were normal, stroke/cardiac labs unremarkable, NIHSS of 0, dysphagia screen unremarkable, and EKG with SR. Thrombolytic tx not provided as patient was not a candidate due to NIHSS score of 0 and free of neuro symptoms. In the ED VSS, and labs significant for hypokalemia (3.2) which improved to normal with potassium supplement. Pt was admitted to medicine service for further management. MRI showed no evidence for intracranial mass, acute territorial infarct, acute intracranial hemorrhage, or midline shift. Neurology was consulted, and recommended opthalmology evaluation as outpatient as pt likely had ocular migraine episode and unlikely TIA as exam non focal and unremarkable imaging studies. Cardiology was consulted. Due to no evidence of ACS, no acute intervention was provided. However, pt did present with postural orthostatic tachycardia syndrome (POTS), which patient reports of having prior to admission. POTS improved with IVF and metoprolol succinate 25mg, which patient tolerated without complications. TTE was done which was unremarkable. Seen and evaluated by PT. No skilled PT indicated.        S: Pt was seen and evaluated by bedside on the day of discharge. Pt is medically optimized for discharge.     Vital Signs Last 24 Hrs  T(C): 36.4 (01 Jun 2023 07:46), Max: 36.6 (01 Jun 2023 04:20)  T(F): 97.6 (01 Jun 2023 07:46), Max: 97.9 (01 Jun 2023 04:20)  HR: 71 (01 Jun 2023 07:46) (71 - 89)  BP: 112/60 (01 Jun 2023 07:46) (99/59 - 127/88)  BP(mean): 82 (01 Jun 2023 04:20) (69 - 82)  RR: 18 (01 Jun 2023 07:46) (16 - 18)  SpO2: 100% (01 Jun 2023 07:46) (100% - 100%)  O2 Parameters below as of 01 Jun 2023 07:46  Patient On (Oxygen Delivery Method): room air    PE  General: Awake and alert, cooperative with exam. No acute distress.   Cardiology: Normal S1, S2. No murmurs. RRR.   Respiratory: Lungs CTABL. No wheezes, rales, or rhonchi.   Gastrointestinal: + BS. Soft. NT. ND. No guarding, rigidity, or rebound tenderness.  Extremities: No peripheral edema bilaterally. 2+ dorsalis pedis pulses.   Neurological: A+Ox3. CN 2-12 intact. No FND. Normal speech. No facial droop.   Psychiatric: Normal affect. Normal mood.    42 year old Female with PMHx of thyroid CA s/p partial thyroidectomy not on medication, Breast cancer s/p Lumpectomy and radiation,  presented to the ED on 5/31/23 (17:44) with complain of palpitations x 1 week. About 16:40pm on 5/31/23, she had sudden onset of chest pain followed by increasingly blurry vision followed by flashing lights in her left field of vision for a total duration of about 30 minutes. All the symptoms resolved in ED. Code stroke initiated upon ED arrival, Code stroke studies including CT head and angio which were normal, stroke/cardiac labs unremarkable, NIHSS of 0, dysphagia screen unremarkable, and EKG with SR. Thrombolytic tx not provided as patient was not a candidate due to NIHSS score of 0 and free of neuro symptoms. In the ED VSS, and labs significant for hypokalemia (3.2) which improved to normal with potassium supplement. Pt was admitted to medicine service for further management. MRI showed no evidence for intracranial mass, acute territorial infarct, acute intracranial hemorrhage, or midline shift. Neurology was consulted, and recommended opthalmology evaluation as outpatient as pt likely had ocular migraine episode and unlikely TIA as exam non focal and unremarkable imaging studies. Cardiology was consulted. Due to no evidence of ACS, no acute intervention was provided. However, pt did present with postural orthostatic tachycardia syndrome (POTS), which patient reports of having prior to admission. POTS improved with IVF and metoprolol succinate 25mg, which patient tolerated without complications. TTE showed no wall motion abnormality and LVEF 60-65 %. Seen and evaluated by PT. No skilled PT indicated.        S: Pt was seen and evaluated by bedside on the day of discharge. Pt is medically optimized for discharge.     Vital Signs Last 24 Hrs  T(C): 36.4 (01 Jun 2023 07:46), Max: 36.6 (01 Jun 2023 04:20)  T(F): 97.6 (01 Jun 2023 07:46), Max: 97.9 (01 Jun 2023 04:20)  HR: 71 (01 Jun 2023 07:46) (71 - 89)  BP: 112/60 (01 Jun 2023 07:46) (99/59 - 127/88)  BP(mean): 82 (01 Jun 2023 04:20) (69 - 82)  RR: 18 (01 Jun 2023 07:46) (16 - 18)  SpO2: 100% (01 Jun 2023 07:46) (100% - 100%)  O2 Parameters below as of 01 Jun 2023 07:46  Patient On (Oxygen Delivery Method): room air    PE  General: Awake and alert, cooperative with exam. No acute distress.   Cardiology: Normal S1, S2. No murmurs. RRR.   Respiratory: Lungs CTABL. No wheezes, rales, or rhonchi.   Gastrointestinal: + BS. Soft. NT. ND. No guarding, rigidity, or rebound tenderness.  Extremities: No peripheral edema bilaterally. 2+ dorsalis pedis pulses.   Neurological: A+Ox3. CN 2-12 intact. No FND. Normal speech. No facial droop.   Psychiatric: Normal affect. Normal mood.     TTE 6/1/23   Summary     The left ventricle is normal in size, wall thickness, wall motion and contractility.   Estimated left ventricular ejection fraction is 60-65 %.   Normal appearing left atrium.   Normal appearing right atrium.   Normal appearing right ventricle structure and function.   The aortic valve is well visualized, appears normal. Valve opening seems to be normal.   Normal appearing mitral valve structure.   Trace mitral regurgitation is present.   No evidence of pericardial effusion.   The IVC appears normal.   An interatrial septal aneurysm is noted.

## 2023-06-01 NOTE — H&P ADULT - ASSESSMENT
A/P:    1.  TIA  Amaurosis Fugax  -follow clinically in telemetry unit  with Neuro checks   -follow PT/OT/Speech eval  -follow MRI brain report  -follow Echo report  -follow labs  -follow Neurology consult    2.  Chest pain  Palpitation  -troponin x2-neg  -follow Echo report  -follow cardiology consult  -follow in telemetry unit for palpitation  -getting iVF    3.  Hypokalemia  -getting K supplement  -follow K level     4.  Code status: patient is in full code status    5.  SCD for DVT ppx

## 2023-06-01 NOTE — PROGRESS NOTE ADULT - ATTENDING COMMENTS
Patient seen and examined at the bedside. Please see resident note for full details regarding the service.     General: Awake and alert, cooperative with exam. No acute distress.   Cardiology: Normal S1, S2. No murmurs. RRR.   Respiratory: Lungs CTABL. No wheezes, rales, or rhonchi.   Gastrointestinal: + BS. Soft. NT. ND. No guarding, rigidity, or rebound tenderness.  Extremities: No peripheral edema bilaterally. 2+ dorsalis pedis pulses.   Neurological: A+Ox3. CN 2-12 intact. No FND. Normal speech. No facial droop.   Psychiatric: Normal affect. Normal mood.     Assessment:   - Palpitations likely secondary to POTS   - Left eye blurry vision and flashing lights likely ocular migraine vs. amaurosis fugax   - Hypokalemia (improved)   - History of thyroid CA s/p partial thyroidectomy not on medication, Breast cancer s/p Lumpectomy and radiation    Plan:   - Aggressive hydration with PO and IVF (given an additional 500 ml bolus of NS and continued on maintenance IVF)   - No drop in BP suggestive of orthostatic hypotension   - Orthostatics suggestive of POTS -> HR laying 71 to  standing   - Neuro recs appreciated - opthalmology evaluation outpt (pt saw her ophthalmologist 2 weeks ago and had an examination that was reported to be normal)   - Spoke with cardiology who recommend: metoprolol succinate 25 mg daily   - ECHO pending   - If ECHO normal and pt tolerates metoprolol can d/c later today   - D/C planning Patient seen and examined at the bedside. Please see resident note for full details regarding the service.     General: Awake and alert, cooperative with exam. No acute distress.   Cardiology: Normal S1, S2. No murmurs. RRR.   Respiratory: Lungs CTABL. No wheezes, rales, or rhonchi.   Gastrointestinal: + BS. Soft. NT. ND. No guarding, rigidity, or rebound tenderness.  Extremities: No peripheral edema bilaterally. 2+ dorsalis pedis pulses.   Neurological: A+Ox3. CN 2-12 intact. No FND. Normal speech. No facial droop.   Psychiatric: Normal affect. Normal mood.     Assessment:   - Palpitations likely secondary to POTS   - Left eye blurry vision and flashing lights likely ocular migraine vs. amaurosis fugax   - Hypokalemia (improved)   - History of thyroid CA s/p partial thyroidectomy not on medication, Breast cancer s/p Lumpectomy and radiation    Plan:   - Aggressive hydration with PO and IVF (given an additional 500 ml bolus of NS and continued on maintenance IVF)   - No drop in BP suggestive of orthostatic hypotension   - Orthostatics suggestive of POTS -> HR laying 71 to  standing   - Neuro recs appreciated - opthalmology evaluation outpt (pt saw her ophthalmologist 2 weeks ago and had an examination that was reported to be normal)   - MRI: No evidence for intracranial mass, acute territorial infarct, acute intracranial hemorrhage, or midline shift.  - Spoke with Dr. Barros who recommended: metoprolol succinate 25 mg daily   - ECHO pending   - If ECHO normal and pt tolerates metoprolol can d/c later today with outpt f/u with cardiology   - D/C planning Patient seen and examined at the bedside. Please see resident note for full details regarding the service.     General: Awake and alert, cooperative with exam. No acute distress.   Cardiology: Normal S1, S2. No murmurs. RRR.   Respiratory: Lungs CTABL. No wheezes, rales, or rhonchi.   Gastrointestinal: + BS. Soft. NT. ND. No guarding, rigidity, or rebound tenderness.  Extremities: No peripheral edema bilaterally. 2+ dorsalis pedis pulses.   Neurological: A+Ox3. CN 2-12 intact. No FND. Normal speech. No facial droop.   Psychiatric: Normal affect. Normal mood.     Assessment:   - Palpitations likely secondary to POTS   - Left eye blurry vision and flashing lights likely ocular migraine vs. amaurosis fugax   - Hypokalemia (improved)   - History of thyroid CA s/p partial thyroidectomy not on medication, Breast cancer s/p Lumpectomy and radiation    Plan:   - Aggressive hydration with PO and IVF (given an additional 500 ml bolus of NS and continued on maintenance IVF)   - No drop in BP suggestive of orthostatic hypotension, TSH WNL   - Orthostatics suggestive of POTS -> HR laying 71 to  standing   - Neuro recs appreciated - opthalmology evaluation outpt (pt saw her ophthalmologist 2 weeks ago and had an examination that was reported to be normal)   - MRI: No evidence for intracranial mass, acute territorial infarct, acute intracranial hemorrhage, or midline shift.  - Spoke with Dr. Barros who recommended: metoprolol succinate 25 mg daily   - ECHO pending   - If ECHO normal and pt tolerates metoprolol can d/c later today with outpt f/u with cardiology   - D/C planning

## 2023-06-01 NOTE — CONSULT NOTE ADULT - SUBJECTIVE AND OBJECTIVE BOX
Neurology Consult requested by:   Patient is a 42y old  Female who presents with a chief complaint of Palpitation, TIA (01 Jun 2023 06:55)     HPI:  42 year old woman PMHx of thyroid CA s/p partial thyroidectomy not on medication, Breast cancer s/p Lumpectomy and radiation,  presented to the ED with complain of palpitations. Describes an episode of transient visual disturbance, saw a spot, likle an oil slick, flashing lights around it, it expanded and her vision became blurred and resolved in about 10-15 minutes. No eye pain, no headache, No trouble with speech, swallowing walking or balance. hx of ocular migraines, not for a long time. ussually wavy lines.      PAST MEDICAL & SURGICAL HISTORY:  History of lumpectomy of left breast      H/O partial thyroidectomy        FAMILY HISTORY:  No pertinent family history in first degree relatives      Social Hx:  Nonsmoker, no drug or alcohol use  Medications and Allergies ReviewedMEDICATIONS  (STANDING):  aspirin enteric coated 81 milliGRAM(s) Oral daily  atorvastatin 80 milliGRAM(s) Oral at bedtime  sodium chloride 0.9%. 1000 milliLiter(s) (100 mL/Hr) IV Continuous <Continuous>     ROS: Pertinent positives in HPI, all other ROS were reviewed and are negative.      Examination:   Vital Signs Last 24 Hrs  T(C): 36.4 (01 Jun 2023 07:46), Max: 36.6 (01 Jun 2023 04:20)  T(F): 97.6 (01 Jun 2023 07:46), Max: 97.9 (01 Jun 2023 04:20)  HR: 71 (01 Jun 2023 07:46) (71 - 89)  BP: 112/60 (01 Jun 2023 07:46) (99/59 - 127/88)  BP(mean): 82 (01 Jun 2023 04:20) (69 - 82)  RR: 18 (01 Jun 2023 07:46) (16 - 18)  SpO2: 100% (01 Jun 2023 07:46) (100% - 100%)    Parameters below as of 01 Jun 2023 07:46  Patient On (Oxygen Delivery Method): room air      General: Cooperative, NAD   NECK: supple, no masses  ENT: Normal hearing   Vascular : no carotid bruits,   Lungs: CTAB  Chest: RRR, no murmurs  Extremities: nontender, no edema  Musculoskeletal: no adventitious movements, no joint stiffness  Skin: no rash    Neurological Examination:  NIHSS:0  MS: AOx3. Appropriately interactive, normal affect. Speech fluent w/o paraphasic error, repetition, naming, reading is intact   CN: VFFTC, PERLL, EOMI, V1-3 sensation intact, face symmetric, hearing intact, palate elevates symmetrically, tongue midline, SCM equal bilaterally  Motor: normal bulk and tone, no tremor, rigidity or bradykinesia.  5/5 all over   Sens: Intact to light touch.    Reflexes: 2/4 all over, downgoing toes b/l  Coord:  No dysmetria, BRUNILDA intact   Gait: Cannot test    Labs: Reviewed  Basic Metabolic Panel (06.01.23 @ 06:39)   Sodium, Serum: 144 mmol/L  Potassium, Serum: 3.9 mmol/L  Chloride, Serum: 112 mmol/L  Carbon Dioxide, Serum: 26 mmol/L  Anion Gap, Serum: 6 mmol/L  Blood Urea Nitrogen, Serum: 8 mg/dL  Creatinine, Serum: 0.64 mg/dL  Glucose, Serum: 75 mg/dL  Calcium, Total Serum: 8.9 mg/dL  eGFR: 112:   Complete Blood Count + Automated Diff (05.31.23 @ 17:55)   WBC Count: 5.09 K/uL  RBC Count: 4.79 M/uL  Hemoglobin: 12.0 g/dL  Hematocrit: 38.3 %  Mean Cell Volume: 80.0 fl  Mean Cell Hemoglobin: 25.1 pg  Mean Cell Hemoglobin Conc: 31.3 gm/dL  Red Cell Distrib Width: 15.7 %  Platelet Count - Automated: 242 K/uL            Imaging:   < from: CT Angio Neck w/ IV Cont (05.31.23 @ 18:13) >    IMPRESSION:    CT PERFUSION demonstrated: No core infarct. No active ischemia.  If symptoms persist consider follow up head CT or MRI, MRA  if no   contraindication.    CTA COW:  Patent intracranial circulation without flow limiting stenosis.    CTA NECK: Patent, ECAs, ICAs, no  hemodynamically significant stenosis at    ICA origins by NASCET criteria.  Bilateral vertebral arteries are patent without flow limiting stenosis.    < end of copied text >      < from: CT Brain Stroke Protocol (05.31.23 @ 17:56) >  FINDINGS:  HEAD CT:  VENTRICLES AND SULCI: Ventricles and sulci are unremarkable for patient   age.  INTRA-AXIAL: No intracranial mass, acute hemorrhage, or midline shift is   present.  EXTRA-AXIAL: No extra-axial fluid collection is present.  INTRACRANIAL HEMORRHAGE: None.    VISUALIZED SINUSES: No air-fluid levels are identified.  VISUALIZED MASTOIDS:  Clear.  CALVARIUM:  Intact.  MISCELLANEOUS:  None.    SOFT TISSUES: Unremarkable.  BONES: Unremarkable.      IMPRESSION:  HEAD CT: No acute hemorrhage or midline shift.    Discussed with Dr. Choi in the ED at 6:06 PM.    < end of copied text >  
  CHIEF COMPLAINT: Patient is a 42y old  Female who presents with a chief complaint of Palpitation, TIA (01 Jun 2023 05:08)      HPI:  42 year old Female with PMHx of thyroid CA s/p partial thyroidectomy not on medication, Breast cancer s/p Lumpectomy and radiation,  presented to the ED with complain of palpitations x 1 week. Describes the palpitations as rapid heart rate, and states that any exertion causes the palpitations to occur. Patient had outpatient  lab work on Sunday which showed normal TSH levels. About 4:40pm yesterday, she had sudden onset of chest pain followed by increasingly blurry vision followed by flashing lights in her left field of vision for a total duration of about 30 minutes. Also mentions burning chest pain which has improved and is now more of an aching, non pleuritic chest pressure and is 3/10 in severity. Denies tunnel vision. Denies headache but notes some mild mid chest tightness that has eased off a bit. Denies LOC, trouble speaking, numbness or weakness of extremities. All the symptoms are resolved since he has arrived in ED.  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 toxic habits      Allergies: Allergies    No Known Allergies    Intolerances            Vital Signs Last 24 Hrs  T(C): 36.3 (01 Jun 2023 05:00), Max: 36.6 (01 Jun 2023 04:20)  T(F): 97.4 (01 Jun 2023 05:00), Max: 97.9 (01 Jun 2023 04:20)  HR: 83 (01 Jun 2023 05:00) (73 - 89)  BP: 114/61 (01 Jun 2023 05:00) (99/59 - 127/88)  BP(mean): 82 (01 Jun 2023 04:20) (69 - 82)  RR: 16 (01 Jun 2023 05:00) (16 - 17)  SpO2: 100% (01 Jun 2023 05:00) (100% - 100%)    Parameters below as of 01 Jun 2023 05:00  Patient On (Oxygen Delivery Method): room air        I&O's Summary          PHYSICAL EXAM:   Constitutional: NAD, awake and alert, well-developed  HEENT: PERR, EOMI, Normal Hearing, MMM  Neck: Soft and supple, No LAD, No JVD  Respiratory: Breath sounds are clear bilaterally, No wheezing, rales or rhonchi  Cardiovascular: S1 and S2, regular rate and rhythm, no Murmurs, gallops or rubs  Gastrointestinal: Bowel Sounds present, soft, nontender, nondistended, no guarding, no rebound  Extremities: No peripheral edema  Vascular: 2+ peripheral pulses  Neurological: A/O x 3, no focal deficits  Musculoskeletal: 5/5 strength b/l upper and lower extremities  Skin: No rashes    MEDICATIONS:  MEDICATIONS  (STANDING):  aspirin enteric coated 81 milliGRAM(s) Oral daily  atorvastatin 80 milliGRAM(s) Oral at bedtime  sodium chloride 0.9%. 1000 milliLiter(s) (100 mL/Hr) IV Continuous <Continuous>      LABS: All Labs Reviewed:                        12.0   5.09  )-----------( 242      ( 31 May 2023 17:55 )             38.3     05-31    141  |  108  |  9   ----------------------------<  106<H>  3.2<L>   |  28  |  0.82    Ca    9.0      31 May 2023 17:55    TPro  7.5  /  Alb  3.8  /  TBili  0.5  /  DBili  x   /  AST  19  /  ALT  22  /  AlkPhos  57  05-31    PT/INR - ( 31 May 2023 17:55 )   PT: 12.3 sec;   INR: 1.06 ratio         PTT - ( 31 May 2023 17:55 )  PTT:27.3 sec  CARDIAC MARKERS ( 31 May 2023 17:55 )  x     / x     / 68 U/L / x     / x              RADIOLOGY:ct< from: CT Angio Head w/ IV Cont (05.31.23 @ 18:12) >      INTERPRETATION:  CT PERFUSION WITH MAPS WITH IV CONTRAST, CT ANGIO NECK   WITHOUT AND OR WITH IV CONTRAST, CT ANGIO BRAIN WITHOUT AND OR WITH IV   CONTRAST  CT PERFUSION  CTA OF THE [place] AND NECK:    INDICATIONS: Code Stroke: acute vision abnl. resolved. 42 year old female   with PMHx of thyroid CA s/p partial thyroidectomy not on medication   currently presents to the ED c/o palpitations x 1 week. Describes the   palpitations as rapid heart rate, and states that any exertion causes the   palpitations to occur. Pt had outpt labwork on Sunday which showed normal   TSH levels. Then pt states that at about 4:50pm today, she had sudden   onset of increasingly blurry vision followed flashing lights in her   vision for a total duration of about 30 minutes. Denies headache but   notes some mild mid chest tightness that has eased off a bit. JCT  No additional clinical history was provided.    TECHNIQUE:  RAPID artificial intelligence was used for perfusion analysis and for   intracranial large vessel occlusion.    Contrast: 50 cc Omnipaque 350 administered. 0 cc discarded. 90 cc   Omnipaque 350 administered. 10 cc discarded.    CTA [place]:  After the intravenous power injection ofnon-ionic contrast material,   serial thin sections were obtained through the intracranial circulation   on a multislice CT scanner.  Images were reformatted using a dedicated 3D   software package and viewed on a dedicated workstation in multiple planes.    CTA NECK:  After the intravenous power injection of non-ionic contrast material,   serial thin sections were obtained through the cervical circulation on a   multislice CT scanner.  Images were reformatted using a dedicated 3D   software package and viewed on a dedicated workstation in multiple planes.    COMPARISON EXAMINATION: None.    FINDINGS:    CT RAPID PERFUSION:  CBF<30% volume: 0 ml  Tmax>6.0 s volume: 0 ml  Mismatch volume: 0 ml  Mismatch ratio: none    CORE INFARCT: None.  TISSUE AT RISK: None.  MISMATCH RATIO: None.      CTA [place]:  ANTERIOR CIRCULATION  ICA  CAVERNOUS, SUPRACLINOID, BIFURCATION SEGMENTS: Patent without flow   limiting stenosis.    ANTERIOR CEREBRAL ARTERIES: Bilateral A1, anterior communicatingand A2   anterior cerebral arteries are unremarkable in course and caliber without   flow limiting stenosis.    MIDDLE CEREBRAL ARTERIES: Patent bilateral M1, M2, and distal MCA   branches without flow limiting stenosis.      POSTERIOR CIRCULATION:  VERTEBRAL ARTERIES: Patent without flow limiting stenosis.  BASILAR ARTERY: Patent no flow limiting stenosis.  POSTERIOR CEREBRAL ARTERIES: Patent without flow limiting stenosis.      CTA NECK:  GREAT VESSELS: Visualized segments are patent, no flow limiting stenosis.    COMMON CAROTID ARTERIES:  RIGHT CCA: Patent without flow limiting stenosis  LEFT CCA: Patent without flow limiting stenosis    CAROTID BULBS:  RIGHT CB: Patent without flow limiting stenosis  LEFT CB: Patent without flow limiting stenosis    INTERNAL CAROTID ARTERIES:  RIGHT ICA: Patent no evidence for any hemodynamically significant   stenosis at the ICA origin by NASCET criteria.  LEFT ICA: Patent no evidence for any hemodynamically significant stenosis   at the ICA origin by NASCET criteria.    VERTEBRAL ARTERIES:  RIGHT VA: Patent no evidence for any flow limiting stenosis.  LEFT VA: Patent no evidence for any flow limiting stenosis.      SOFT TISSUES: Right thyroidectomy.  BONES: Unremarkable      IMPRESSION:    CT PERFUSION demonstrated: No core infarct. No active ischemia.  If symptoms persist consider follow up head CT or MRI, MRA  if no   contraindication.    CTA COW:  Patent intracranial circulation without flow limiting stenosis.    CTA NECK: Patent, ECAs, ICAs, no  hemodynamically significant stenosis at    ICA origins by NASCET criteria.  Bilateral vertebral arteries are patent without flow limiting stenosis.    --- End of Report ---        < end of copied text >      EKG:  < from: 12 Lead ECG (05.28.23 @ 11:26) >    Diagnosis Line Normal sinus rhythm  Possible Left atrial enlargement  Borderline ECG  No previous ECGs available  Confirmed by Palla MD, Mahin ([id number]) on 5/28/2023 10:36:45 PM    < end of copied text >    Telemetry:    ECHO: pending

## 2023-06-01 NOTE — DISCHARGE NOTE NURSING/CASE MANAGEMENT/SOCIAL WORK - NSDCPEFALRISK_GEN_ALL_CORE
For information on Fall & Injury Prevention, visit: [email or web address] OR  [email or web address] OR  https://www.cdc.gov/steadi/patient.html

## 2023-06-01 NOTE — SWALLOW BEDSIDE ASSESSMENT ADULT - SWALLOW EVAL: PROGNOSIS
2) Pt exhibits functional Oropharyngeal Swallowing integrity for age. Bolus formation/transfer were mechanically functional for age and laryngeal lift on palpation during swallowing trials was felt to be functional for age as well. NO behavioral aspiration signs exhibited. No change in O2 sats noted. Odynophagia was denied.

## 2023-06-01 NOTE — DISCHARGE NOTE PROVIDER - NSDCCPCAREPLAN_GEN_ALL_CORE_FT
PRINCIPAL DISCHARGE DIAGNOSIS  Diagnosis: Amaurosis fugax  Assessment and Plan of Treatment: The blurry vision followed by flashing lights is likely from ocular migraine (Amaurosis fugax). Ocular migraine can mimic other serious conditions, so it is very important to see an eye doctor as soon as possible. Please follow up with your opthalmologist for further management.      SECONDARY DISCHARGE DIAGNOSES  Diagnosis: Postural orthostatic tachycardia syndrome  Assessment and Plan of Treatment: Continue to take Metoprolol Succinate 25mg daily. Follow up with Dr. Barros for further management.     PRINCIPAL DISCHARGE DIAGNOSIS  Diagnosis: Amaurosis fugax  Assessment and Plan of Treatment: The blurry vision followed by flashing lights is likely from ocular migraine or Amaurosis fugax  (a transient loss of vision in one eye). Ocular migraine can mimic other serious conditions, so it is very important to see an eye doctor as soon as possible. Please follow up with your opthalmologist for further management.      SECONDARY DISCHARGE DIAGNOSES  Diagnosis: Postural orthostatic tachycardia syndrome  Assessment and Plan of Treatment: Continue to take Metoprolol Succinate 25mg daily. Follow up with Dr. Barros for further management.     PRINCIPAL DISCHARGE DIAGNOSIS  Diagnosis: Ocular migraine  Assessment and Plan of Treatment: The blurry vision followed by flashing lights is likely from ocular migraine or Amaurosis fugax  (a transient loss of vision in one eye). Ocular migraine can mimic other serious conditions, so it is very important to see an eye doctor as soon as possible. Please follow up with your opthalmologist for further management.      SECONDARY DISCHARGE DIAGNOSES  Diagnosis: Amaurosis fugax  Assessment and Plan of Treatment: The blurry vision followed by flashing lights is likely from ocular migraine or Amaurosis fugax  (a transient loss of vision in one eye).  Please follow up with your opthalmologist for further management.    Diagnosis: Postural orthostatic tachycardia syndrome  Assessment and Plan of Treatment: Continue to take Metoprolol Succinate 25mg daily. Follow up with Dr. Barros for further management.

## 2023-06-01 NOTE — DISCHARGE NOTE PROVIDER - CARE PROVIDER_API CALL
Hammad Barros  Cardiovascular Disease  [address], [address]  [place] [address]  Phone: [phone]  Fax: [phone]  Established Patient  Follow Up Time: 1 week

## 2023-06-01 NOTE — PHYSICAL THERAPY INITIAL EVALUATION ADULT - MODALITIES TREATMENT COMMENTS
Patient left with transporter on stretcher to MRI. Patient independent in functional mobility, no skilled physical therapy need in this setting.  May ambulate per nursing.  Will d/c from PT. RN, CM informed.

## 2023-06-01 NOTE — SWALLOW BEDSIDE ASSESSMENT ADULT - SLP GENERAL OBSERVATIONS
Pt is alert and interactive. Her receptive language abilities are felt to be preserved and she was able to verbalize during communicative probes without evidence of a primary motor speech or primary linguistic pathology.  Pt is communicatively competent and at reported communicative baseline.

## 2023-06-01 NOTE — CONSULT NOTE ADULT - ASSESSMENT
42 F with  increased palpitations and  visual changes     Palpitations-  dehydration vs POTS -- check orthostatics, check echocardiogram, tele reveals SR thus far  no evidence for ACS  will add low dose toprol  recommend aggressive hydration PO  and or IV    CT imaging of head reveals preserved blood flow without stenosis and no evidence for acute stroke- neuro eval     keep K+ > 4 and mg > 2     if echo normal and not orthostatic, no cardiac contraindication to DC     
42 year old woman with PMHx of thyroid CA s/p partial thyroidectomy, Breast cancer s/p Lumpectomy and radiation,  presented to the ED with complain of palpitations and  visual changes. Now resolved. hx of ocular migraines. Exam non focal. CT head, CT angios were normal.   Likely ocular migraine episode, though different from her past experiences. Likely triggered by increasing anxiety, worried due to the palpitations.  Suggest;  MR head pending  ophthalmology evaluation as OPT

## 2023-06-06 DIAGNOSIS — G90.A POSTURAL ORTHOSTATIC TACHYCARDIA SYNDROME [POTS]: ICD-10-CM

## 2023-06-06 DIAGNOSIS — Z85.3 PERSONAL HISTORY OF MALIGNANT NEOPLASM OF BREAST: ICD-10-CM

## 2023-06-06 DIAGNOSIS — Z85.850 PERSONAL HISTORY OF MALIGNANT NEOPLASM OF THYROID: ICD-10-CM

## 2023-06-06 DIAGNOSIS — E89.0 POSTPROCEDURAL HYPOTHYROIDISM: ICD-10-CM

## 2023-06-06 DIAGNOSIS — Z92.3 PERSONAL HISTORY OF IRRADIATION: ICD-10-CM

## 2023-06-06 DIAGNOSIS — G45.3 AMAUROSIS FUGAX: ICD-10-CM

## 2023-06-06 DIAGNOSIS — E87.6 HYPOKALEMIA: ICD-10-CM

## 2023-06-06 DIAGNOSIS — G43.B0 OPHTHALMOPLEGIC MIGRAINE, NOT INTRACTABLE: ICD-10-CM

## 2023-07-20 PROBLEM — Z78.9 OTHER SPECIFIED HEALTH STATUS: Chronic | Status: ACTIVE | Noted: 2023-06-01

## 2023-10-31 PROBLEM — Z00.00 ENCOUNTER FOR PREVENTIVE HEALTH EXAMINATION: Status: ACTIVE | Noted: 2023-10-31

## 2023-11-01 ENCOUNTER — NON-APPOINTMENT (OUTPATIENT)
Age: 43
End: 2023-11-01

## 2023-11-01 ENCOUNTER — APPOINTMENT (OUTPATIENT)
Dept: DERMATOLOGY | Facility: CLINIC | Age: 43
End: 2023-11-01
Payer: COMMERCIAL

## 2023-11-01 ENCOUNTER — TRANSCRIPTION ENCOUNTER (OUTPATIENT)
Age: 43
End: 2023-11-01

## 2023-11-01 DIAGNOSIS — D22.5 MELANOCYTIC NEVI OF TRUNK: ICD-10-CM

## 2023-11-01 DIAGNOSIS — L81.4 OTHER MELANIN HYPERPIGMENTATION: ICD-10-CM

## 2023-11-01 DIAGNOSIS — Z80.8 FAMILY HISTORY OF MALIGNANT NEOPLASM OF OTHER ORGANS OR SYSTEMS: ICD-10-CM

## 2023-11-01 DIAGNOSIS — D18.01 HEMANGIOMA OF SKIN AND SUBCUTANEOUS TISSUE: ICD-10-CM

## 2023-11-01 DIAGNOSIS — L72.0 EPIDERMAL CYST: ICD-10-CM

## 2023-11-01 PROCEDURE — 99204 OFFICE O/P NEW MOD 45 MIN: CPT

## 2023-11-01 RX ORDER — METOPROLOL SUCCINATE 200 MG/1
TABLET, EXTENDED RELEASE ORAL
Refills: 0 | Status: ACTIVE | COMMUNITY

## 2023-11-01 RX ORDER — PANTOPRAZOLE 40 MG/1
TABLET, DELAYED RELEASE ORAL
Refills: 0 | Status: ACTIVE | COMMUNITY

## 2023-11-01 RX ORDER — LORATADINE 5 MG/5 ML
SOLUTION, ORAL ORAL
Refills: 0 | Status: ACTIVE | COMMUNITY

## 2024-01-04 ENCOUNTER — OUTPATIENT (OUTPATIENT)
Dept: OUTPATIENT SERVICES | Facility: HOSPITAL | Age: 44
LOS: 1 days | Discharge: HOME | End: 2024-01-04
Payer: COMMERCIAL

## 2024-01-04 VITALS
TEMPERATURE: 99 F | DIASTOLIC BLOOD PRESSURE: 66 MMHG | SYSTOLIC BLOOD PRESSURE: 108 MMHG | RESPIRATION RATE: 13 BRPM | HEART RATE: 83 BPM | WEIGHT: 154.98 LBS | HEIGHT: 64 IN | OXYGEN SATURATION: 100 %

## 2024-01-04 DIAGNOSIS — Z98.890 OTHER SPECIFIED POSTPROCEDURAL STATES: Chronic | ICD-10-CM

## 2024-01-04 DIAGNOSIS — E89.0 POSTPROCEDURAL HYPOTHYROIDISM: Chronic | ICD-10-CM

## 2024-01-04 DIAGNOSIS — R07.9 CHEST PAIN, UNSPECIFIED: ICD-10-CM

## 2024-01-04 DIAGNOSIS — R19.4 CHANGE IN BOWEL HABIT: ICD-10-CM

## 2024-01-04 LAB
HCG UR QL: NEGATIVE — SIGNIFICANT CHANGE UP
HCG UR QL: NEGATIVE — SIGNIFICANT CHANGE UP

## 2024-01-04 PROCEDURE — 88305 TISSUE EXAM BY PATHOLOGIST: CPT

## 2024-01-04 PROCEDURE — 88312 SPECIAL STAINS GROUP 1: CPT

## 2024-01-04 PROCEDURE — 88305 TISSUE EXAM BY PATHOLOGIST: CPT | Mod: 26

## 2024-01-04 PROCEDURE — 81025 URINE PREGNANCY TEST: CPT

## 2024-01-04 PROCEDURE — 88313 SPECIAL STAINS GROUP 2: CPT | Mod: 26

## 2024-01-04 PROCEDURE — 88312 SPECIAL STAINS GROUP 1: CPT | Mod: 26

## 2024-01-04 PROCEDURE — 88313 SPECIAL STAINS GROUP 2: CPT

## 2024-01-04 NOTE — ASU PATIENT PROFILE, ADULT - FALL HARM RISK - UNIVERSAL INTERVENTIONS
Bed in lowest position, wheels locked, appropriate side rails in place/Call bell, personal items and telephone in reach/Instruct patient to call for assistance before getting out of bed or chair/Non-slip footwear when patient is out of bed/[place] to call system/Physically safe environment - no spills, clutter or unnecessary equipment/Purposeful Proactive Rounding/Room/bathroom lighting operational, light cord in reach Bed in lowest position, wheels locked, appropriate side rails in place/Call bell, personal items and telephone in reach/Instruct patient to call for assistance before getting out of bed or chair/Non-slip footwear when patient is out of bed/[place] to call system/Physically safe environment - no spills, clutter or unnecessary equipment/Purposeful Proactive Rounding/Room/bathroom lighting operational, light cord in reach

## 2024-01-04 NOTE — ASU PATIENT PROFILE, ADULT - NSICDXPASTMEDICALHX_GEN_ALL_CORE_FT
PAST MEDICAL HISTORY:  Breast cancer     No pertinent past medical history     POTS (postural orthostatic tachycardia syndrome)     Thyroid cancer

## 2024-01-08 DIAGNOSIS — R19.4 CHANGE IN BOWEL HABIT: ICD-10-CM

## 2024-01-08 DIAGNOSIS — K29.50 UNSPECIFIED CHRONIC GASTRITIS WITHOUT BLEEDING: ICD-10-CM

## 2024-01-08 DIAGNOSIS — Z85.3 PERSONAL HISTORY OF MALIGNANT NEOPLASM OF BREAST: ICD-10-CM

## 2024-01-08 DIAGNOSIS — Z85.850 PERSONAL HISTORY OF MALIGNANT NEOPLASM OF THYROID: ICD-10-CM

## 2024-01-08 DIAGNOSIS — K21.9 GASTRO-ESOPHAGEAL REFLUX DISEASE WITHOUT ESOPHAGITIS: ICD-10-CM

## 2024-01-08 DIAGNOSIS — Z88.1 ALLERGY STATUS TO OTHER ANTIBIOTIC AGENTS STATUS: ICD-10-CM

## 2024-01-08 LAB
SURGICAL PATHOLOGY STUDY: SIGNIFICANT CHANGE UP
SURGICAL PATHOLOGY STUDY: SIGNIFICANT CHANGE UP

## 2024-01-08 NOTE — ED STATDOCS - NPI NUMBER (FOR SYSADMIN USE ONLY) :
H/o MAZE procedure in 2013, with recurrent PAF since.   Patient takes Eliquis 5BID as an outpatient.   Eliquis restarted for PAF, --> now d/c'd and started on heparin gtt currently remains Aflutter HR 80-100s.   Continue Lopressor as tolerated by HR and BP for HR control. [[id number]]

## 2024-02-26 NOTE — ED ADULT NURSE NOTE - CAS TRG GEN SKIN CONDITION
well developed, well nourished , in no acute distress , ambulating without difficulty , normal communication ability
Warm

## 2024-03-12 PROBLEM — C50.919 MALIGNANT NEOPLASM OF UNSPECIFIED SITE OF UNSPECIFIED FEMALE BREAST: Chronic | Status: ACTIVE | Noted: 2024-01-04

## 2024-03-12 PROBLEM — G90.A POSTURAL ORTHOSTATIC TACHYCARDIA SYNDROME [POTS]: Chronic | Status: ACTIVE | Noted: 2024-01-04

## 2024-03-12 PROBLEM — C73 MALIGNANT NEOPLASM OF THYROID GLAND: Chronic | Status: ACTIVE | Noted: 2024-01-04

## 2024-04-11 ENCOUNTER — APPOINTMENT (OUTPATIENT)
Dept: OTOLARYNGOLOGY | Facility: CLINIC | Age: 44
End: 2024-04-11
Payer: COMMERCIAL

## 2024-04-11 VITALS
HEIGHT: 64 IN | DIASTOLIC BLOOD PRESSURE: 70 MMHG | HEART RATE: 68 BPM | WEIGHT: 152 LBS | SYSTOLIC BLOOD PRESSURE: 106 MMHG | BODY MASS INDEX: 25.95 KG/M2

## 2024-04-11 DIAGNOSIS — J35.8 OTHER CHRONIC DISEASES OF TONSILS AND ADENOIDS: ICD-10-CM

## 2024-04-11 PROCEDURE — 99203 OFFICE O/P NEW LOW 30 MIN: CPT

## 2024-04-11 NOTE — PHYSICAL EXAM
[Midline] : trachea located in midline position [de-identified] : tonsils 1 plus small tonsillith [Normal] : no rashes

## 2024-04-11 NOTE — REASON FOR VISIT
[Initial Consultation] : an initial consultation for [Tonsillitis] : tonsillitis [FreeTextEntry2] : tonsils

## 2025-03-18 NOTE — H&P ADULT - NSHPPHYSICALEXAM_GEN_ALL_CORE
03/18/25 1551   Child Life   Location [facility]   Interaction Intent Follow Up/Ongoing support   Method in-person   Individuals Present Patient;Caregiver/Adult Family Member  (Pt's mother and father present)   Intervention Procedural Support   Procedure Support Comment Received referral from [facility] regarding lab support with [facility]. [facility] met with pt and pt's caregivers to assess coping needs and offer supportive interventions for pt's lab draw. During lab draw with [facility], pt laid on bed in blanket and CCLS/caregivers provided alternative focus with music and developmentally appropriate toys (i.e. spinning wheel). Pt appropriately upset with poke and able to intermittently calm with comfort/re-engagement in alternative focus. Pt coped well throughout with supportive interventions.   Distress low distress;appropriate   Outcomes/Follow Up Continue to Follow/Support   Time Spent   Direct Patient Care 15   Indirect Patient Care 10   Total Time Spent (Calc) 25       
T(C): 36.3 (06-01-23 @ 05:00), Max: 36.6 (06-01-23 @ 04:20)  HR: 83 (06-01-23 @ 05:00) (73 - 89)  BP: 114/61 (06-01-23 @ 05:00) (99/59 - 127/88)  RR: 16 (06-01-23 @ 05:00) (16 - 17)  SpO2: 100% (06-01-23 @ 05:00) (100% - 100%)    CONSTITUTIONAL: Well groomed, no apparent distress  EYES: PERRLA and symmetric, EOMI, No conjunctival or scleral injection, non-icteric  ENMT: Oral mucosa with moist membranes. Normal dentition; no pharyngeal injection or exudates             NECK: Supple, symmetric and without tracheal deviation   RESP: No respiratory distress, no use of accessory muscles; CTA b/l, no WRR  CV: RRR, +S1S2, no MRG; no JVD; no peripheral edema  GI: Soft, NT, ND, no rebound, no guarding; no palpable masses; no hepatosplenomegaly; no hernia palpated  LYMPH: No cervical LAD or tenderness;   MSK: Normal gait; Normal ROM without pain, normal muscle strength/tone  SKIN: No rashes or ulcers noted;   NEURO: CN II-XII intact; normal reflexes in upper and lower extremities, sensation intact in upper and lower extremities b/l to light touch   PSYCH: Appropriate insight/judgment; A+O x 3, mood and affect appropriate, recent/remote memory intact